# Patient Record
Sex: FEMALE | Race: WHITE
[De-identification: names, ages, dates, MRNs, and addresses within clinical notes are randomized per-mention and may not be internally consistent; named-entity substitution may affect disease eponyms.]

---

## 2018-02-01 ENCOUNTER — HOSPITAL ENCOUNTER (EMERGENCY)
Dept: HOSPITAL 24 - ER | Age: 33
LOS: 1 days | Discharge: HOME | End: 2018-02-02
Payer: COMMERCIAL

## 2018-02-01 VITALS — DIASTOLIC BLOOD PRESSURE: 81 MMHG | SYSTOLIC BLOOD PRESSURE: 135 MMHG

## 2018-02-01 VITALS — BODY MASS INDEX: 34.7 KG/M2

## 2018-02-01 DIAGNOSIS — G43.909: ICD-10-CM

## 2018-02-01 DIAGNOSIS — K59.09: ICD-10-CM

## 2018-02-01 DIAGNOSIS — R10.84: Primary | ICD-10-CM

## 2018-02-01 LAB
ALBUMIN SERPL BCP-MCNC: 3.8 G/DL (ref 3.4–5)
ALP SERPL-CCNC: 65 UNITS/L (ref 46–116)
ALT SERPL W P-5'-P-CCNC: 39 UNITS/L (ref 12–78)
AMYLASE SERPL-CCNC: 37 UNITS/L (ref 25–115)
AST SERPL W P-5'-P-CCNC: 23 UNITS/L (ref 15–37)
BASOPHILS # BLD AUTO: 0.1 X10^3/UL (ref 0–0.1)
BASOPHILS NFR BLD AUTO: 1.3 % (ref 0.2–1)
BUN SERPL-MCNC: 13 MG/DL (ref 7–18)
CALCIUM ALBUM COR SERPL-SCNC: (no result) MG/DL (ref 8.5–10.1)
CALCIUM ALBUM COR SERPL-SCNC: (no result) MMOL/L (ref 136–145)
CALCIUM SERPL-MCNC: 9.1 MG/DL (ref 8.5–10.1)
CHLORIDE SERPL-SCNC: 105 MMOL/L (ref 98–107)
CO2 SERPL-SCNC: 22.5 MMOL/L (ref 21–32)
CREAT SERPL-MCNC: 0.8 MG/DL (ref 0.55–1.02)
EGFR  BLACK RACES: > 60 (ref 60–?)
EOSINOPHIL # BLD AUTO: 0 X10^3/UL (ref 0–0.2)
EOSINOPHIL NFR BLD AUTO: 0 % (ref 0.9–2.9)
ERYTHROCYTE [DISTWIDTH] IN BLOOD BY AUTOMATED COUNT: 13 % (ref 11.6–16.5)
HCT VFR BLD AUTO: 38.3 % (ref 36–47)
HGB BLD-MCNC: 13.1 G/DL (ref 12–16)
LIPASE SERPL-CCNC: 134 UNITS/L (ref 73–393)
LYMPHOCYTES # BLD AUTO: 1.2 X10^3/UL (ref 1.3–2.9)
LYMPHOCYTES NFR BLD AUTO: 14.1 % (ref 21–51)
MCH RBC QN AUTO: 31.7 PG (ref 27–34)
MCHC RBC AUTO-ENTMCNC: 34.1 G/DL (ref 33–35)
MCV RBC AUTO: 92.8 FL (ref 80–100)
MONOCYTES # BLD AUTO: 0.2 X10^3/UL (ref 0.3–0.8)
MONOCYTES NFR BLD AUTO: 2.6 % (ref 0–13)
NEUTROPHILS # BLD AUTO: 7 X10^3/UL (ref 2.2–4.8)
NEUTROPHILS NFR BLD AUTO: 82 % (ref 42–75)
PLATELET # BLD: 355 X10^3/UL (ref 150–450)
PMV BLD AUTO: 8.6 FL (ref 7.4–11)
PROT SERPL-MCNC: 8.2 G/DL (ref 6.4–8.2)
RBC # BLD AUTO: 4.13 X10^6/UL (ref 3.5–5.4)
SODIUM SERPL-SCNC: 138 MMOL/L (ref 136–145)
WBC NRBC COR # BLD AUTO: 8.6 X10^3/UL (ref 3.6–10)

## 2018-02-01 PROCEDURE — 96372 THER/PROPH/DIAG INJ SC/IM: CPT

## 2018-02-01 PROCEDURE — 74022 RADEX COMPL AQT ABD SERIES: CPT

## 2018-02-01 PROCEDURE — 83690 ASSAY OF LIPASE: CPT

## 2018-02-01 PROCEDURE — 85025 COMPLETE CBC W/AUTO DIFF WBC: CPT

## 2018-02-01 PROCEDURE — 36415 COLL VENOUS BLD VENIPUNCTURE: CPT

## 2018-02-01 PROCEDURE — 82150 ASSAY OF AMYLASE: CPT

## 2018-02-01 PROCEDURE — 99283 EMERGENCY DEPT VISIT LOW MDM: CPT

## 2018-02-01 PROCEDURE — 80053 COMPREHEN METABOLIC PANEL: CPT

## 2018-02-01 NOTE — DR.GENAD
HPI





- PCP


Primary Care Physician: destiney





- Complaint/Symptoms


Chief Complaint Doctors Comments: ABDOMINAL PAIN, MIGRAINE HEADACHE TIMES


Chief Complaint:: stomach pain migraine


Self Treatment fo Chief Complaint: patient ahs done a colon cleanse today and 

has take linzess. patient has take toradol zofran and decadron at her pcp today 

for migraines





- Nurses notes reviewed


Nurses Notes Review: Yes





- Source


History Provided: Patient





- Mode of Arrival


Mode of Arrival: Ambulatory





- Timing


Onset of Chief Complaint: 01/11/18


Came on: Gradually





- Duration


Duration: Constant


Duration: Days





- Severity


Severity: Moderate





PMH





- PMH


Past Medical History: Yes


Past Medical History: Headaches


Past Surgical History: Yes


Surgical History: Cholecystectomy


Past Surgical History Comment: left knee repair





- Family History


History of Family Medical Conditions: Yes


Family Medical History: Diabetes Mellitus, Cancer, Coronary Artery Disease, 

Hypertension





- Social History


Does patient currently use any type of tobacco product: No


Have you used tobacco products in the last 12 months: No


Type of Tobacco Use: None


Does any household member use tobacco: No


Alcohol Use: None


Do you use any recreational Drugs:: No


Lives With: Family


Lives Where: Home





- infectious screening


In the last 2 months have you had wt loss of >10#?: NO


Have you had fever, night sweats or hemotysis?: No


Have you traveled outside the country in the last 6 months?: No


Isolation: Standard





ROS





- Review of Systems


Constitutional: No Symptoms Reported


Eyes: No Symptoms Reported


ENTM: No Symptoms Reported


Respiratoy: No Symptoms Reported


Cardiovascular: No Symptoms Reported


Gastrointestinal/Abdominal: No Symptoms Reported


Genitourinary: No Symptoms Reported


Neurological: No Symptoms Reported


Musculoskeletal: No Symptoms Reported


Integumentary: No Symptoms Reported


Hematologic/Lymphatic: No Symptoms Reported


Endocrine: No Symptoms Reported


All Other Systems: Reviewed and Negative





PE





- Vital Signs


Vitals: 


 





Temperature                      97.9 F


Pulse Rate                       95


Respiratory Rate                 16


Blood Pressure                   135/81


O2 Sat by Pulse Oximetry         96











- General


Limitations: No Limitations


General Appearance: Alert





- Head


Head Exam: Normal Inspection





- Eyes


Eye exam: Normal Appearance





- ENT


ENT Exam: Normal  External Ear Exam


External Ear Exam: Normal External Inspection


TM/Canal Exam: Bilateral Normal


Nose Exam: Normal Nose Exam


Mouth Exam: Normal Inspection


Throat Exam: Normal Inspection





- Neck


Neck Exam: Trachea Midline





- Chest


Chest Inspection: Symmetric Chest Wall Rise





- Respiratory


Respiratory Exam: Normal Lung Sounds Bilat


Respiratory Exam: Bilateral Clear to Auscultation





- Cardiovascular


Cardiovascular Exam: Regular Rate, Normal Rhythm, Normal Heart Sounds





- Abdominal Exam


Abdominal Exam: Normal Bowel Sounds, Soft, Tenderness





- Extremities


Extremities Exam: Normal Inspection





- Back


Back Exam: Normal Inspection





- Neurologic


Neurological Exam: Alert, Oriented X3





- Psychiatric


Psychiatric Exam: Normal Affect, Normal Mood





- Skin


Skin Exam: Normal Color





MDM





- Additional Information


Additional Information Obtained From: Family





- Differential Diagnosis


Differential Diagnosis: ABDOMINAL PAIN, MIGRAIN HEADACHE





ROR





- Labs Reviewed


Result Diagrams: 


 02/01/18 22:47





 02/01/18 22:47


Laboratory: 


 











WBC  8.6 X10^3/uL (3.6-10.0)   02/01/18  22:47    


 


RBC  4.13 X10^6/uL (3.5-5.4)   02/01/18  22:47    


 


Hgb  13.1 g/dL (12.0-16.0)   02/01/18  22:47    


 


Hct  38.3 % (36.0-47.0)   02/01/18  22:47    


 


MCV  92.8 fL (80.0-100.0)   02/01/18  22:47    


 


MCH  31.7 pg (27.0-34.0)   02/01/18  22:47    


 


MCHC  34.1 g/dL (33.0-35.0)   02/01/18  22:47    


 


RDW  13.0 % (11.6-16.5)   02/01/18  22:47    


 


Plt Count  355 X10^3/uL (150.0-450.0)   02/01/18  22:47    


 


MPV  8.6 fL (7.4-11.0)   02/01/18  22:47    


 


Neut %  82.0 % (42.0-75.0)  H  02/01/18  22:47    


 


Lymph %  14.1 % (21.0-51.0)  L  02/01/18  22:47    


 


Mono %  2.6 % (0.0-13.0)   02/01/18  22:47    


 


Eos %  0.0 % (0.9-2.9)  L  02/01/18  22:47    


 


Baso %  1.3 % (0.2-1.0)  H  02/01/18  22:47    


 


Neut #  7.0 x10^3/uL (2.2-4.8)  H  02/01/18  22:47    


 


Lymph #  1.2 X10^3/uL (1.3-2.9)  L  02/01/18  22:47    


 


Mono #  0.2 x10^3/uL (0.3-0.8)  L  02/01/18  22:47    


 


Eos #  0.0 x10^3/uL (0.0-0.2)   02/01/18  22:47    


 


Baso #  0.1 X10^3/uL (0.0-0.1)   02/01/18  22:47    


 


Absolute Nucleated RBC  0.0 /100WBC  02/01/18  22:47    


 


Sodium  138 mmol/L (136-145)   02/01/18  22:47    


 


Corrected Sodium  TNP   02/01/18  22:47    


 


Potassium  4.1 mmol/L (3.5-5.1)   02/01/18  22:47    


 


Chloride  105 mmol/L ()   02/01/18  22:47    


 


Carbon Dioxide  22.5 mmol/L (21-32)   02/01/18  22:47    


 


BUN  13 mg/dL (7-18)   02/01/18  22:47    


 


Creatinine  0.80 mg/dL (0.55-1.02)   02/01/18  22:47    


 


Est GFR (MDRD) Af Amer  > 60  (>60)   02/01/18  22:47    


 


Est GFR (MDRD) Non-Af  > 60  (>60)   02/01/18  22:47    


 


Glucose  100 mg/dL (65-99)  H  02/01/18  22:47    


 


Calcium  9.1 mg/dL (8.5-10.1)   02/01/18  22:47    


 


Corrected Calcium  TNP   02/01/18  22:47    


 


Total Bilirubin  0.30 mg/dL (0.2-1.0)   02/01/18  22:47    


 


AST  23 Units/L (15-37)   02/01/18  22:47    


 


ALT  39 Units/L (12-78)   02/01/18  22:47    


 


Alkaline Phosphatase  65 Units/L ()   02/01/18  22:47    


 


Total Protein  8.2 g/dL (6.4-8.2)   02/01/18  22:47    


 


Albumin  3.8 g/dL (3.4-5.0)   02/01/18  22:47    


 


Globulin  4.4 g/dL (2.5-4.5)   02/01/18  22:47    


 


Albumin/Globulin Ratio  0.9 Ratio (1.1-2.1)  L  02/01/18  22:47    


 


Amylase  37 Units/L ()   02/01/18  22:47    


 


Lipase  134 Units/L ()   02/01/18  22:47    














- Discharge Plan


Condition: Stable





- Follow ups/Referrals


Follow ups/Referrals: 


PRASHANT COOK [Primary Care Provider] - 3 days





- Instructions


Instructions:  Abdominal Pain, Adult, Easy-to-Read, Constipation, Adult, Easy-to

-Read, Migraine Headache, Easy-to-Read

## 2018-02-01 NOTE — RAD
Acute abdominal series with PA chest



Indication: Right lower quadrant pain after colon cleanse



Comparison: None available



Findings: The heart size is normal and the lungs are clear. There is moderate colonic stool. The connie
l gas pattern is nonobstructed, although relative paucity small bowel gas limits evaluation. No gross
 free air. Cholecystectomy clips overlie the right upper quadrant.



Impression:

Moderate colonic stool. No acute process.







Reported By:Electronically Signed by LEESA ZHOU MD at 2/1/2018 11:08:21 PM